# Patient Record
Sex: FEMALE | Race: ASIAN | NOT HISPANIC OR LATINO | Employment: STUDENT | URBAN - METROPOLITAN AREA
[De-identification: names, ages, dates, MRNs, and addresses within clinical notes are randomized per-mention and may not be internally consistent; named-entity substitution may affect disease eponyms.]

---
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
## 2021-01-29 ENCOUNTER — CLINICAL SUPPORT (OUTPATIENT)
Dept: INTERNAL MEDICINE CLINIC | Age: 21
End: 2021-01-29
Payer: COMMERCIAL

## 2021-01-29 DIAGNOSIS — Z23 NEED FOR HEPATITIS B VACCINATION: Primary | ICD-10-CM

## 2021-01-29 PROCEDURE — 90471 IMMUNIZATION ADMIN: CPT

## 2021-01-29 PROCEDURE — 90746 HEPB VACCINE 3 DOSE ADULT IM: CPT

## 2021-03-02 ENCOUNTER — OFFICE VISIT (OUTPATIENT)
Dept: INTERNAL MEDICINE CLINIC | Facility: CLINIC | Age: 21
End: 2021-03-02
Payer: COMMERCIAL

## 2021-03-02 VITALS
TEMPERATURE: 98.5 F | DIASTOLIC BLOOD PRESSURE: 80 MMHG | OXYGEN SATURATION: 97 % | HEIGHT: 60 IN | HEART RATE: 90 BPM | SYSTOLIC BLOOD PRESSURE: 118 MMHG | BODY MASS INDEX: 29.45 KG/M2 | WEIGHT: 150 LBS

## 2021-03-02 DIAGNOSIS — Z78.9 HEPATITIS B VACCINATION STATUS UNKNOWN: ICD-10-CM

## 2021-03-02 DIAGNOSIS — D50.9 IRON DEFICIENCY ANEMIA, UNSPECIFIED IRON DEFICIENCY ANEMIA TYPE: ICD-10-CM

## 2021-03-02 DIAGNOSIS — R73.03 PREDIABETES: ICD-10-CM

## 2021-03-02 DIAGNOSIS — R00.2 PALPITATIONS: Primary | ICD-10-CM

## 2021-03-02 DIAGNOSIS — E78.00 PURE HYPERCHOLESTEROLEMIA: ICD-10-CM

## 2021-03-02 PROBLEM — J02.9 ACUTE PHARYNGITIS: Status: RESOLVED | Noted: 2019-12-31 | Resolved: 2021-03-02

## 2021-03-02 PROCEDURE — 99214 OFFICE O/P EST MOD 30 MIN: CPT | Performed by: NURSE PRACTITIONER

## 2021-03-02 PROCEDURE — 93000 ELECTROCARDIOGRAM COMPLETE: CPT | Performed by: NURSE PRACTITIONER

## 2021-03-02 NOTE — PROGRESS NOTES
Assessment/Plan:       Problem List Items Addressed This Visit        Other    Prediabetes     Check A1c         Relevant Orders    HEMOGLOBIN A1C W/ EAG ESTIMATION    Palpitations - Primary     EKG WNL  Will check CBC, CMP, TSH, iron panel  Follow up in 1 week or sooner  Reviewed red flag signs to call the office with or go to the Emergency Department with  Patient verbalizes understanding  Relevant Orders    CBC and differential    Comprehensive metabolic panel    TSH, 3rd generation with Free T4 reflex    Iron Panel (Includes Ferritin, Iron Sat%, Iron, and TIBC)    HLD (hyperlipidemia)     Check lipid panel         Anemia     Get updated labs to see if this is potentially cause of palpitations  Resume iron supplementation         Relevant Orders    Iron Panel (Includes Ferritin, Iron Sat%, Iron, and TIBC)      Other Visit Diagnoses     Hepatitis B vaccination status unknown        Relevant Orders    Hepatitis B surface antibody            BMI Counseling: Body mass index is 29 29 kg/m²  The BMI is above normal  Nutrition recommendations include encouraging healthy choices of fruits and vegetables  Subjective:      Patient ID: Kal Johns is a 22 y o  female  Pt presents for follow up  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: She reports that 4 days ago she was sitting at her desk doing [facility]- felt like her heart was racing and felt like she was trying to catch her breath  She checked her HR on her watch and it was low 90s  It lasted a couple of minutes  It happened again yesterday at work  She reports that over the course of an hour, she had intermittent episodes and her HR ranged from   She didn't have CP at that time  She felt like she was exercising but she wasn't  She didn't feel stressed/anxious at that time  She denies swelling of her ankles/legs, N/V/D, headaches  She needs to know her hepatitis b titers for her MA program      Palpitations  This is a new problem   The current episode started in the past 7 days  The problem occurs every several days  The problem has been waxing and waning  Pertinent negatives include no abdominal pain, anorexia, arthralgias, change in bowel habit, chest pain, chills, congestion, coughing, diaphoresis, fatigue, fever, headaches, joint swelling, myalgias, nausea, neck pain, numbness, rash, sore throat, swollen glands, urinary symptoms, vertigo, visual change, vomiting or weakness  Nothing aggravates the symptoms  She has tried rest for the symptoms  The treatment provided mild relief  The following portions of the patient's history were reviewed and updated as appropriate: allergies, current medications, past family history, past medical history, past social history, past surgical history and problem list     Review of Systems   Constitutional: Negative for chills, diaphoresis, fatigue and fever  HENT: Negative for congestion and sore throat  Respiratory: Positive for shortness of breath (per HPI)  Negative for cough and wheezing  Cardiovascular: Positive for palpitations  Negative for chest pain and leg swelling  Gastrointestinal: Negative for abdominal pain, anorexia, change in bowel habit, nausea and vomiting  Musculoskeletal: Negative for arthralgias, joint swelling, myalgias and neck pain  Skin: Negative for rash  Neurological: Negative for dizziness, vertigo, weakness, numbness and headaches  Psychiatric/Behavioral: The patient is not nervous/anxious            Past Medical History:   Diagnosis Date    Acute conjunctivitis     Acute lower UTI     Anemia 4/22/2019    Anxiety     Dermatitis, eczematoid     Developmental reading disorder     resolved 08/15/2016    Dysmenorrhea     Eczema     Hand, foot and mouth disease     resolved 08/15/2016    Low back pain     Palpitations     Pharyngitis     Refractive amblyopia     Refractive amblyopia     resolved 08/15/2016    Sebaceous cyst     UTI symptoms     Viral infection     Vitamin D deficiency     Wears glasses     Wears glasses          Current Outpatient Medications:     norgestimate-ethinyl estradiol (Tri-Linyah) 0 18/0 215/0 25 MG-35 MCG per tablet, Take 1 tablet by mouth daily, Disp: 90 tablet, Rfl: 1    No Known Allergies    Social History   Past Surgical History:   Procedure Laterality Date    BREAST BIOPSY Right 04/11/2018    BREAST BIOPSY Right 6/25/2019    Procedure: EXCISIONAL BIOPSY BREAST;  Surgeon: Britton Jackson MD;  Location: [facility];  Service: Surgical Oncology    TYMPANOSTOMY TUBE PLACEMENT  2005    per allscripts     Family History   Adopted: Yes   Problem Relation Age of Onset    No Known Problems Mother     No Known Problems Father        Objective:  /80 (BP Location: Left arm, Patient Position: Sitting, Cuff Size: Adult)   Pulse 90   Temp 98 5 °F (36 9 °C)   Ht 5' (1 524 m)   Wt 68 kg (150 lb)   SpO2 97%   BMI 29 29 kg/m²        Physical Exam  Constitutional:       General: She is not in acute distress  Appearance: She is well-developed  HENT:      Head: Normocephalic and atraumatic  Right Ear: External ear normal       Left Ear: External ear normal       Mouth/Throat:      Pharynx: No oropharyngeal exudate  Eyes:      General: No scleral icterus  Pupils: Pupils are equal, round, and reactive to light  Neck:      Musculoskeletal: Normal range of motion and neck supple  Cardiovascular:      Rate and Rhythm: Normal rate and regular rhythm  Pulmonary:      Effort: Pulmonary effort is normal       Breath sounds: Normal breath sounds  Abdominal:      Palpations: Abdomen is soft  Musculoskeletal: Normal range of motion  Lymphadenopathy:      Cervical: No cervical adenopathy  Skin:     General: Skin is warm  Neurological:      Mental Status: She is alert and oriented to person, place, and time     Psychiatric:         Behavior: Behavior normal

## 2021-03-02 NOTE — ASSESSMENT & PLAN NOTE
EKG WNL  Will check CBC, CMP, TSH, iron panel  Follow up in 1 week or sooner  Reviewed red flag signs to call the office with or go to the Emergency Department with  Patient verbalizes understanding

## 2021-03-04 ENCOUNTER — LAB (OUTPATIENT)
Dept: LAB | Facility: CLINIC | Age: 21
End: 2021-03-04
Payer: COMMERCIAL

## 2021-03-04 DIAGNOSIS — R73.03 PREDIABETES: ICD-10-CM

## 2021-03-04 DIAGNOSIS — Z78.9 HEPATITIS B VACCINATION STATUS UNKNOWN: ICD-10-CM

## 2021-03-04 DIAGNOSIS — D50.9 IRON DEFICIENCY ANEMIA, UNSPECIFIED IRON DEFICIENCY ANEMIA TYPE: ICD-10-CM

## 2021-03-04 DIAGNOSIS — R00.2 PALPITATIONS: ICD-10-CM

## 2021-03-04 LAB
ALBUMIN SERPL BCP-MCNC: 3.8 G/DL (ref 3.5–5)
ALP SERPL-CCNC: 84 U/L (ref 46–116)
ALT SERPL W P-5'-P-CCNC: 13 U/L (ref 12–78)
ANION GAP SERPL CALCULATED.3IONS-SCNC: 4 MMOL/L (ref 4–13)
AST SERPL W P-5'-P-CCNC: 10 U/L (ref 5–45)
BASOPHILS # BLD AUTO: 0.03 THOUSANDS/ΜL (ref 0–0.1)
BASOPHILS NFR BLD AUTO: 1 % (ref 0–1)
BILIRUB SERPL-MCNC: 0.47 MG/DL (ref 0.2–1)
BUN SERPL-MCNC: 16 MG/DL (ref 5–25)
CALCIUM SERPL-MCNC: 8.7 MG/DL (ref 8.3–10.1)
CHLORIDE SERPL-SCNC: 104 MMOL/L (ref 100–108)
CO2 SERPL-SCNC: 29 MMOL/L (ref 21–32)
CREAT SERPL-MCNC: 0.54 MG/DL (ref 0.6–1.3)
EOSINOPHIL # BLD AUTO: 0.08 THOUSAND/ΜL (ref 0–0.61)
EOSINOPHIL NFR BLD AUTO: 2 % (ref 0–6)
ERYTHROCYTE [DISTWIDTH] IN BLOOD BY AUTOMATED COUNT: 11.6 % (ref 11.6–15.1)
EST. AVERAGE GLUCOSE BLD GHB EST-MCNC: 117 MG/DL
FERRITIN SERPL-MCNC: 29 NG/ML (ref 8–388)
GFR SERPL CREATININE-BSD FRML MDRD: 136 ML/MIN/1.73SQ M
GLUCOSE P FAST SERPL-MCNC: 102 MG/DL (ref 65–99)
HBA1C MFR BLD: 5.7 %
HBV SURFACE AB SER-ACNC: >1000 MIU/ML
HCT VFR BLD AUTO: 39 % (ref 34.8–46.1)
HGB BLD-MCNC: 12.4 G/DL (ref 11.5–15.4)
IMM GRANULOCYTES # BLD AUTO: 0.01 THOUSAND/UL (ref 0–0.2)
IMM GRANULOCYTES NFR BLD AUTO: 0 % (ref 0–2)
IRON SATN MFR SERPL: 43 %
IRON SERPL-MCNC: 185 UG/DL (ref 50–170)
LYMPHOCYTES # BLD AUTO: 2.18 THOUSANDS/ΜL (ref 0.6–4.47)
LYMPHOCYTES NFR BLD AUTO: 45 % (ref 14–44)
MCH RBC QN AUTO: 27.4 PG (ref 26.8–34.3)
MCHC RBC AUTO-ENTMCNC: 31.8 G/DL (ref 31.4–37.4)
MCV RBC AUTO: 86 FL (ref 82–98)
MONOCYTES # BLD AUTO: 0.38 THOUSAND/ΜL (ref 0.17–1.22)
MONOCYTES NFR BLD AUTO: 8 % (ref 4–12)
NEUTROPHILS # BLD AUTO: 2.17 THOUSANDS/ΜL (ref 1.85–7.62)
NEUTS SEG NFR BLD AUTO: 44 % (ref 43–75)
NRBC BLD AUTO-RTO: 0 /100 WBCS
PLATELET # BLD AUTO: 286 THOUSANDS/UL (ref 149–390)
PMV BLD AUTO: 10.1 FL (ref 8.9–12.7)
POTASSIUM SERPL-SCNC: 3.9 MMOL/L (ref 3.5–5.3)
PROT SERPL-MCNC: 7.4 G/DL (ref 6.4–8.2)
RBC # BLD AUTO: 4.53 MILLION/UL (ref 3.81–5.12)
SODIUM SERPL-SCNC: 137 MMOL/L (ref 136–145)
TIBC SERPL-MCNC: 429 UG/DL (ref 250–450)
TSH SERPL DL<=0.05 MIU/L-ACNC: 1.24 UIU/ML (ref 0.46–3.98)
WBC # BLD AUTO: 4.85 THOUSAND/UL (ref 4.31–10.16)

## 2021-03-04 PROCEDURE — 82728 ASSAY OF FERRITIN: CPT

## 2021-03-04 PROCEDURE — 86706 HEP B SURFACE ANTIBODY: CPT

## 2021-03-04 PROCEDURE — 83540 ASSAY OF IRON: CPT

## 2021-03-04 PROCEDURE — 80053 COMPREHEN METABOLIC PANEL: CPT

## 2021-03-04 PROCEDURE — 83550 IRON BINDING TEST: CPT

## 2021-03-04 PROCEDURE — 36415 COLL VENOUS BLD VENIPUNCTURE: CPT

## 2021-03-04 PROCEDURE — 84443 ASSAY THYROID STIM HORMONE: CPT

## 2021-03-04 PROCEDURE — 83036 HEMOGLOBIN GLYCOSYLATED A1C: CPT

## 2021-03-04 PROCEDURE — 85025 COMPLETE CBC W/AUTO DIFF WBC: CPT

## 2021-03-09 ENCOUNTER — OFFICE VISIT (OUTPATIENT)
Dept: INTERNAL MEDICINE CLINIC | Facility: CLINIC | Age: 21
End: 2021-03-09
Payer: COMMERCIAL

## 2021-03-09 VITALS
SYSTOLIC BLOOD PRESSURE: 122 MMHG | TEMPERATURE: 98.2 F | HEIGHT: 60 IN | WEIGHT: 151 LBS | OXYGEN SATURATION: 98 % | DIASTOLIC BLOOD PRESSURE: 70 MMHG | HEART RATE: 92 BPM | BODY MASS INDEX: 29.64 KG/M2

## 2021-03-09 DIAGNOSIS — D50.9 IRON DEFICIENCY ANEMIA, UNSPECIFIED IRON DEFICIENCY ANEMIA TYPE: ICD-10-CM

## 2021-03-09 DIAGNOSIS — F41.9 ANXIETY: Primary | ICD-10-CM

## 2021-03-09 DIAGNOSIS — R73.03 PREDIABETES: ICD-10-CM

## 2021-03-09 DIAGNOSIS — E78.00 PURE HYPERCHOLESTEROLEMIA: ICD-10-CM

## 2021-03-09 DIAGNOSIS — E55.9 VITAMIN D DEFICIENCY: ICD-10-CM

## 2021-03-09 DIAGNOSIS — R00.2 PALPITATIONS: ICD-10-CM

## 2021-03-09 PROCEDURE — 99214 OFFICE O/P EST MOD 30 MIN: CPT | Performed by: NURSE PRACTITIONER

## 2021-03-09 RX ORDER — SERTRALINE HYDROCHLORIDE 25 MG/1
25 TABLET, FILM COATED ORAL DAILY
Qty: 30 TABLET | Refills: 2 | Status: SHIPPED | OUTPATIENT
Start: 2021-03-09 | End: 2021-05-28 | Stop reason: SDUPTHER

## 2021-03-09 NOTE — PROGRESS NOTES
Assessment/Plan:       Problem List Items Addressed This Visit        Other    Vitamin D deficiency     Lab wasn't completed with recent labs  Will have patient get it completed         Prediabetes     A1c 5 7, continue to make lifestyle modifications         Palpitations     No further episodes  EKG last week  F/u if symptoms recur         HLD (hyperlipidemia)     Lipid panel wasn't collected when labs completed last week  Will have patient get them tested         Anxiety - Primary     Start sertraline 25mg daily  No previous SSRI use  Continue CBT  Expected side effects and onset of benefit reviewed  Reviewed red flag signs to call the office with or go to the Emergency Department with  Patient verbalizes understanding  F/u in 1 month or sooner          Relevant Medications    sertraline (ZOLOFT) 25 mg tablet    Anemia     Stable at present                      Subjective:      Patient ID: Kristin Montez is a 22 y o  female  Patient presents for a 1 week follow up on chronic conditions and palpitations  She had labs completed on 3/4, results reviewed  A1c unchanged at 5 7  Iron studies stable  CBC, CMP, TSH all WNL  She has not had any heart palpitations since last visit  She denies new symptoms    She has been taking to her counselor about whether she would like to start something for anxiety  She has been seeing her counselor for the past couple of months  She has panic attacks about 1-2 times per month if she is in a high-stress situation  Anxiety  Presents for initial visit  Onset was more than 5 years ago  The problem has been gradually worsening  Symptoms include hyperventilation, irritability, nervous/anxious behavior and panic   Patient reports no chest pain, compulsions, confusion, decreased concentration, depressed mood, dizziness, dry mouth, excessive worry, feeling of choking, insomnia, muscle tension, nausea, obsessions, palpitations, restlessness, shortness of breath or suicidal ideas  Symptoms occur occasionally  The severity of symptoms is mild  Exacerbated by: school  The quality of sleep is good  There are no known risk factors  Her past medical history is significant for anemia  Past treatments include counseling (CBT)  The treatment provided mild relief  Compliance with prior treatments has been good  The following portions of the patient's history were reviewed and updated as appropriate: allergies, current medications, past family history, past medical history, past social history, past surgical history and problem list     Review of Systems   Constitutional: Positive for irritability  Negative for chills, fatigue and fever  HENT: Negative for trouble swallowing  Respiratory: Negative for shortness of breath and wheezing  Cardiovascular: Negative for chest pain, palpitations and leg swelling  Gastrointestinal: Negative for abdominal pain, constipation, diarrhea, nausea and vomiting  Genitourinary: Negative for difficulty urinating  Musculoskeletal: Negative for arthralgias and gait problem  Skin: Negative for rash  Neurological: Negative for dizziness, light-headedness and headaches  Psychiatric/Behavioral: Negative for agitation, confusion, decreased concentration, self-injury, sleep disturbance and suicidal ideas  The patient is nervous/anxious  The patient does not have insomnia            Past Medical History:   Diagnosis Date    Acute conjunctivitis     Acute lower UTI     Anemia 4/22/2019    Anxiety     Dermatitis, eczematoid     Developmental reading disorder     resolved 08/15/2016    Dysmenorrhea     Eczema     Hand, foot and mouth disease     resolved 08/15/2016    Low back pain     Palpitations     Pharyngitis     Refractive amblyopia     Refractive amblyopia     resolved 08/15/2016    Sebaceous cyst     UTI symptoms     Viral infection     Vitamin D deficiency     Wears glasses     Wears glasses          Current Outpatient Medications:     norgestimate-ethinyl estradiol (Tri-Linyah) 0 18/0 215/0 25 MG-35 MCG per tablet, Take 1 tablet by mouth daily, Disp: 90 tablet, Rfl: 1    sertraline (ZOLOFT) 25 mg tablet, Take 1 tablet (25 mg total) by mouth daily, Disp: 30 tablet, Rfl: 2    No Known Allergies    Social History   Past Surgical History:   Procedure Laterality Date    BREAST BIOPSY Right 04/11/2018    BREAST BIOPSY Right 6/25/2019    Procedure: EXCISIONAL BIOPSY BREAST;  Surgeon: Deedee Ann MD;  Location: [facility] OR;  Service: Surgical Oncology    TYMPANOSTOMY TUBE PLACEMENT  2005    per allscripts     Family History   Adopted: Yes   Problem Relation Age of Onset    No Known Problems Mother     No Known Problems Father        Objective:  /70 (BP Location: Left arm, Patient Position: Sitting, Cuff Size: Adult)   Pulse 92   Temp 98 2 °F (36 8 °C)   Ht 5' (1 524 m)   Wt 68 5 kg (151 lb)   SpO2 98%   BMI 29 49 kg/m²        Physical Exam  Constitutional:       General: She is not in acute distress  Appearance: She is well-developed  HENT:      Head: Normocephalic and atraumatic  Right Ear: External ear normal       Left Ear: External ear normal       Mouth/Throat:      Pharynx: No oropharyngeal exudate  Eyes:      General: No scleral icterus  Pupils: Pupils are equal, round, and reactive to light  Neck:      Musculoskeletal: Normal range of motion and neck supple  Cardiovascular:      Rate and Rhythm: Normal rate and regular rhythm  Pulmonary:      Effort: Pulmonary effort is normal       Breath sounds: Normal breath sounds  Abdominal:      Palpations: Abdomen is soft  Musculoskeletal: Normal range of motion  Lymphadenopathy:      Cervical: No cervical adenopathy  Skin:     General: Skin is warm  Neurological:      Mental Status: She is alert and oriented to person, place, and time     Psychiatric:         Behavior: Behavior normal

## 2021-03-09 NOTE — PATIENT INSTRUCTIONS
Prescribed a new medication today for depression/anxiety  Start with 1/2 tab daily for 1 week then increase to full tab daily  This medication can take up to 3-12 weeks to take full effect  Any adverse reactions, such as worsening symptoms, decreased libido, or suicidal ideation, warrant a call to the office urgently and a change in medication  These negative effects likely show up early in the medication course  Follow up in 1 month or sooner as needed

## 2021-03-09 NOTE — ASSESSMENT & PLAN NOTE
Start sertraline 25mg daily  No previous SSRI use  Continue CBT  Expected side effects and onset of benefit reviewed  Reviewed red flag signs to call the office with or go to the Emergency Department with  Patient verbalizes understanding     F/u in 1 month or sooner

## 2021-03-12 ENCOUNTER — CLINICAL SUPPORT (OUTPATIENT)
Dept: INTERNAL MEDICINE CLINIC | Facility: CLINIC | Age: 21
End: 2021-03-12
Payer: COMMERCIAL

## 2021-03-12 DIAGNOSIS — Z23 NEED FOR HPV VACCINATION: Primary | ICD-10-CM

## 2021-03-12 PROCEDURE — 90471 IMMUNIZATION ADMIN: CPT

## 2021-03-12 PROCEDURE — 90651 9VHPV VACCINE 2/3 DOSE IM: CPT

## 2021-04-12 ENCOUNTER — OFFICE VISIT (OUTPATIENT)
Dept: INTERNAL MEDICINE CLINIC | Facility: CLINIC | Age: 21
End: 2021-04-12
Payer: COMMERCIAL

## 2021-04-12 VITALS
SYSTOLIC BLOOD PRESSURE: 110 MMHG | BODY MASS INDEX: 27.45 KG/M2 | TEMPERATURE: 98.6 F | DIASTOLIC BLOOD PRESSURE: 68 MMHG | HEIGHT: 61 IN | WEIGHT: 145.4 LBS | OXYGEN SATURATION: 97 % | HEART RATE: 96 BPM

## 2021-04-12 DIAGNOSIS — F41.9 ANXIETY: ICD-10-CM

## 2021-04-12 DIAGNOSIS — N94.6 DYSMENORRHEA: Primary | ICD-10-CM

## 2021-04-12 PROCEDURE — 99213 OFFICE O/P EST LOW 20 MIN: CPT | Performed by: NURSE PRACTITIONER

## 2021-04-12 RX ORDER — NORGESTIMATE AND ETHINYL ESTRADIOL 7DAYSX3 28
1 KIT ORAL DAILY
Qty: 90 TABLET | Refills: 2 | Status: SHIPPED | OUTPATIENT
Start: 2021-04-12 | End: 2021-10-15 | Stop reason: SDUPTHER

## 2021-04-12 NOTE — ASSESSMENT & PLAN NOTE
Patient is doing well on current dosing of Zoloft    Continue dosing in follow-up in 3 months or sooner as needed

## 2021-04-12 NOTE — ASSESSMENT & PLAN NOTE
Will refill OCP  Counseled on using barrier methods to prevent STIs  Educated on risks of VTE, avoiding smoking, importance of compliance     Will start paps when patient turns 22

## 2021-04-12 NOTE — PROGRESS NOTES
Assessment/Plan:       Problem List Items Addressed This Visit        Genitourinary    Dysmenorrhea - Primary     Will refill OCP  Counseled on using barrier methods to prevent STIs  Educated on risks of VTE, avoiding smoking, importance of compliance  Will start paps when patient turns 22         Relevant Medications    norgestimate-ethinyl estradiol (Tri-Linyah) 0 18/0 215/0 25 MG-35 MCG per tablet       Other    Anxiety     Patient is doing well on current dosing of Zoloft  Continue dosing in follow-up in 3 months or sooner as needed                      Subjective:      Patient ID: Isael Lugo is a 22 y o  female  Pt presents for follow up on anxiety  She is also requesting a refill of her OCP  Anxiety:  At her last visit, she was started on sertraline 25 mg daily  She reports that her anxiety is improving and she is feeling that the medication is providing her some benefit  She denies further panic attacks, feels comfortable with her current dosing at present  Contraception Counseling  Patient presents for contraception counseling  The patient has no complaints today  The patient is sexually active  Pertinent past medical history: none  She denies being a smoker, having hypertension, VTE, STD history, UTI history, migraines  She is currently taking Tri-Linyah and is happy with that OCP  Denies intermenstrual bleeding  Is compliant with taking it daily  The following portions of the patient's history were reviewed and updated as appropriate: allergies, current medications, past family history, past medical history, past social history, past surgical history and problem list     Review of Systems   Constitutional: Negative for chills and fever  HENT: Negative for trouble swallowing  Respiratory: Negative for shortness of breath  Cardiovascular: Negative for chest pain  Gastrointestinal: Negative for abdominal pain, diarrhea, nausea and vomiting     Genitourinary: Negative for difficulty urinating  Musculoskeletal: Negative for gait problem  Skin: Negative for rash  Neurological: Negative for dizziness and headaches  Psychiatric/Behavioral: Negative for dysphoric mood and sleep disturbance  The patient is nervous/anxious (improving)  Past Medical History:   Diagnosis Date    Acute conjunctivitis     Acute lower UTI     Anemia 4/22/2019    Anxiety     Dermatitis, eczematoid     Developmental reading disorder     resolved 08/15/2016    Dysmenorrhea     Eczema     Hand, foot and mouth disease     resolved 08/15/2016    Low back pain     Palpitations     Pharyngitis     Refractive amblyopia     Refractive amblyopia     resolved 08/15/2016    Sebaceous cyst     UTI symptoms     Viral infection     Vitamin D deficiency     Wears glasses     Wears glasses          Current Outpatient Medications:     norgestimate-ethinyl estradiol (Tri-Linyah) 0 18/0 215/0 25 MG-35 MCG per tablet, Take 1 tablet by mouth daily, Disp: 90 tablet, Rfl: 2    sertraline (ZOLOFT) 25 mg tablet, Take 1 tablet (25 mg total) by mouth daily, Disp: 30 tablet, Rfl: 2    No Known Allergies    Social History   Past Surgical History:   Procedure Laterality Date    BREAST BIOPSY Right 04/11/2018    BREAST BIOPSY Right 6/25/2019    Procedure: EXCISIONAL BIOPSY BREAST;  Surgeon: Olinda Morales MD;  Location: [facility] OR;  Service: Surgical Oncology    TYMPANOSTOMY TUBE PLACEMENT  2005    per allscripts     Family History   Adopted: Yes   Problem Relation Age of Onset    No Known Problems Mother     No Known Problems Father        Objective:  /68 (BP Location: Left arm, Patient Position: Sitting, Cuff Size: Adult)   Pulse 96   Temp 98 6 °F (37 °C) (Tympanic)   Ht 5' 0 5" (1 537 m)   Wt 66 kg (145 lb 6 4 oz)   SpO2 97% Comment: ra  BMI 27 93 kg/m²        Physical Exam  Constitutional:       General: She is not in acute distress  Appearance: She is well-developed     HENT: Head: Normocephalic and atraumatic  Right Ear: External ear normal       Left Ear: External ear normal       Mouth/Throat:      Pharynx: No oropharyngeal exudate  Eyes:      General: No scleral icterus  Pupils: Pupils are equal, round, and reactive to light  Neck:      Musculoskeletal: Normal range of motion and neck supple  Thyroid: No thyromegaly  Cardiovascular:      Rate and Rhythm: Normal rate and regular rhythm  Pulmonary:      Effort: Pulmonary effort is normal  No respiratory distress  Breath sounds: Normal breath sounds  Abdominal:      General: Bowel sounds are normal  There is no distension  Palpations: Abdomen is soft  Musculoskeletal: Normal range of motion  Lymphadenopathy:      Cervical: No cervical adenopathy  Skin:     General: Skin is warm and dry  Neurological:      Mental Status: She is alert and oriented to person, place, and time     Psychiatric:         Behavior: Behavior normal

## 2021-04-28 ENCOUNTER — TELEMEDICINE (OUTPATIENT)
Dept: INTERNAL MEDICINE CLINIC | Age: 21
End: 2021-04-28
Payer: COMMERCIAL

## 2021-04-28 VITALS — WEIGHT: 144 LBS | HEIGHT: 61 IN | BODY MASS INDEX: 27.19 KG/M2

## 2021-04-28 DIAGNOSIS — J06.9 URTI (ACUTE UPPER RESPIRATORY INFECTION): Primary | ICD-10-CM

## 2021-04-28 DIAGNOSIS — J02.8 PHARYNGITIS DUE TO OTHER ORGANISM: ICD-10-CM

## 2021-04-28 PROCEDURE — 99213 OFFICE O/P EST LOW 20 MIN: CPT | Performed by: INTERNAL MEDICINE

## 2021-04-28 RX ORDER — CEPHALEXIN 500 MG/1
500 CAPSULE ORAL EVERY 12 HOURS SCHEDULED
Qty: 20 CAPSULE | Refills: 0 | Status: SHIPPED | OUTPATIENT
Start: 2021-04-28 | End: 2021-05-08

## 2021-04-28 RX ORDER — FLUTICASONE PROPIONATE 50 MCG
1 SPRAY, SUSPENSION (ML) NASAL DAILY
Qty: 1 BOTTLE | Refills: 0 | Status: SHIPPED | OUTPATIENT
Start: 2021-04-28 | End: 2021-07-14

## 2021-04-28 NOTE — PROGRESS NOTES
Virtual Regular Visit      Assessment/Plan:      Upper respiratory tract infection with pharyngitis  - likely viral with bacterial superinfection vs bacterial  - we will start pt on  Cephalexin 500 mg twice daily for 10 days, Flonase nasal spray for rhinitis and she was counseled to buy over-the-counter Mucinex for productive cough  - Pt may use OTC tylenol or ibuprofen with meals for aches and pains, warm salt water gargles for any sore throat and was encouraged to drink lots of fluids, get plenty of rest and wash her hands liberally  - pt was also counseled to take antibiotics with food and also to use a probiotic like yogurt daily as long as she is taking antibiotics  - She should return to the clinic if her symptoms worsen or do not improve  Problem List Items Addressed This Visit        Digestive    Pharyngitis    Relevant Medications    fluticasone (FLONASE) 50 mcg/act nasal spray    cephalexin (KEFLEX) 500 mg capsule       Respiratory    URTI (acute upper respiratory infection) - Primary    Relevant Medications    fluticasone (FLONASE) 50 mcg/act nasal spray    cephalexin (KEFLEX) 500 mg capsule               Reason for visit is   Chief Complaint   Patient presents with    Sinus Problem     sinus congetion x10 days, has tried OTC medication and not helping     Virtual Regular Visit        Encounter provider Maya Pacheco DO    Provider located at [address] [facility] [id number]      Recent Visits  No visits were found meeting these conditions  Showing recent visits within past 7 days and meeting all other requirements     Today's Visits  Date Type Provider Dept   04/28/21 Telemedicine Amirah Eubanks DO [facility]   Showing today's visits and meeting all other requirements     Future Appointments  No visits were found meeting these conditions     Showing future appointments within next 150 days and meeting all other requirements        The patient was identified by name and date of birth  Alli Johnston was informed that this is a telemedicine visit and that the visit is being conducted through [address] and patient was informed that this is not a secure, HIPAA-compliant platform  She agrees to proceed     My office door was closed  No one else was in the room  She acknowledged consent and understanding of privacy and security of the video platform  The patient has agreed to participate and understands they can discontinue the visit at any time  Patient is aware this is a billable service  Subjective  Alli Johnston is a 22 y o  female    HPI     Patient presents with complaints of  Nasal/sinus congestion, rhinorrhea, postnasal drip, a feeling of pressure in her ears especially when she blows her nose, and cough productive of yellowish green phlegm for the past 10 days  She states that she has tried Mucinex and over-the-counter antihistamine but her symptoms have persisted  She denies fever, chills, night sweats, sore throat, sneezing,  Headache,shortness of breath, palpitations, chest pain, nausea, vomiting, abdominal pain, diarrhea, constipation, myalgias, arthralgias, change in sense of taste or smell      Past Medical History:   Diagnosis Date    Acute conjunctivitis     Acute lower UTI     Anemia 4/22/2019    Anxiety     Dermatitis, eczematoid     Developmental reading disorder     resolved 08/15/2016    Dysmenorrhea     Eczema     Hand, foot and mouth disease     resolved 08/15/2016    Low back pain     Palpitations     Pharyngitis     Refractive amblyopia     Refractive amblyopia     resolved 08/15/2016    Sebaceous cyst     UTI symptoms     Viral infection     Vitamin D deficiency     Wears glasses     Wears glasses        Past Surgical History:   Procedure Laterality Date    BREAST BIOPSY Right 04/11/2018    BREAST BIOPSY Right 6/25/2019    Procedure: EXCISIONAL BIOPSY BREAST;  Surgeon: Luz Maria Ferris MD;  [facility];  Service: Surgical Oncology    TYMPANOSTOMY TUBE PLACEMENT  2005    per allscripts       Current Outpatient Medications   Medication Sig Dispense Refill    norgestimate-ethinyl estradiol (Tri-Linyah) 0 18/0 215/0 25 MG-35 MCG per tablet Take 1 tablet by mouth daily 90 tablet 2    sertraline (ZOLOFT) 25 mg tablet Take 1 tablet (25 mg total) by mouth daily 30 tablet 2    cephalexin (KEFLEX) 500 mg capsule Take 1 capsule (500 mg total) by mouth every 12 (twelve) hours for 10 days 20 capsule 0    fluticasone (FLONASE) 50 mcg/act nasal spray 1 spray into each nostril daily 1 Bottle 0     No current facility-administered medications for this visit  No Known Allergies    Review of Systems   Constitutional: Negative for activity change, chills, fatigue, fever and unexpected weight change  HENT: Positive for congestion, postnasal drip (now resolved) and rhinorrhea (clear colored discharge for 10 days and has taken mucinex and zyrtec with out improving)  Negative for ear pain (pressure when she blows her nose), sinus pressure, sneezing and sore throat  Eyes: Negative for pain  Respiratory: Positive for cough (occasionally productive of yellowish green phlegm)  Negative for choking, chest tightness, shortness of breath and wheezing  Cardiovascular: Negative for chest pain, palpitations and leg swelling  Gastrointestinal: Negative for abdominal pain, constipation, diarrhea, nausea and vomiting  Genitourinary: Negative for dysuria and hematuria  Musculoskeletal: Negative for arthralgias, back pain, gait problem, joint swelling, myalgias and neck stiffness  Skin: Negative for pallor and rash  Neurological: Negative for dizziness, tremors, seizures, syncope, light-headedness and headaches  Hematological: Negative for adenopathy  Psychiatric/Behavioral: Negative for behavioral problems         Video Exam    Vitals: 04/28/21 1557   Weight: 65 3 kg (144 lb)   Height: 5' 0 5" (1 537 m)       Physical Exam  Constitutional:       General: She is not in acute distress  Appearance: Normal appearance  She is not ill-appearing or toxic-appearing  HENT:      Head: Normocephalic and atraumatic  Mouth/Throat:      Mouth: Mucous membranes are dry  Pharynx: Posterior oropharyngeal erythema ( oropharyngeal erythema with dry mucous membranes) present  Eyes:      General: No scleral icterus  Right eye: No discharge  Left eye: No discharge  Neck:      Musculoskeletal: Normal range of motion  Pulmonary:      Effort: Pulmonary effort is normal  No respiratory distress (  No conversational dyspnea)  Abdominal:      Tenderness: There is no abdominal tenderness ( no tenderness on self palpation)  Skin:     Coloration: Skin is not jaundiced  Neurological:      Mental Status: She is alert and oriented to person, place, and time  Psychiatric:         Behavior: Behavior normal           I spent 15 minutes directly with the patient during this visit      VIRTUAL VISIT DISCLAIMER    Mita Hayward acknowledges that she has consented to an online visit or consultation  She understands that the online visit is based solely on information provided by her, and that, in the absence of a face-to-face physical evaluation by the physician, the diagnosis she receives is both limited and provisional in terms of accuracy and completeness  This is not intended to replace a full medical face-to-face evaluation by the physician  Mita Hayward understands and accepts these terms

## 2021-04-28 NOTE — PATIENT INSTRUCTIONS
- Drink plenty of fluids  - Get plenty of rest  - You may use salt water gargles for your sore throat  - You may use over the counter tylenol or Ibuprofen (motrin or Advil) for any headache, muscle aches and fever  - Please take your antibiotics with meals and also take a probiotic like yogurt daily as long as you are on your antibiotics  - Call the office if your symptoms persist beyond a total of 7-10 days

## 2021-05-28 DIAGNOSIS — F41.9 ANXIETY: ICD-10-CM

## 2021-05-28 NOTE — TELEPHONE ENCOUNTER
Pending appt 7/12/2021  Last assessed 4/12/2021      [facility] for pt to call me at [facility] office    Pt called and LMOM on rx line stating she needed refill of zoloft but did not state what pharmacy  LM requesting she call office and let us know what pharmacy she wants medication sent

## 2021-06-01 RX ORDER — SERTRALINE HYDROCHLORIDE 25 MG/1
25 TABLET, FILM COATED ORAL DAILY
Qty: 30 TABLET | Refills: 2 | Status: SHIPPED | OUTPATIENT
Start: 2021-06-01 | End: 2021-07-14 | Stop reason: SDUPTHER

## 2021-07-14 ENCOUNTER — OFFICE VISIT (OUTPATIENT)
Dept: INTERNAL MEDICINE CLINIC | Facility: CLINIC | Age: 21
End: 2021-07-14
Payer: COMMERCIAL

## 2021-07-14 VITALS
OXYGEN SATURATION: 98 % | TEMPERATURE: 98.6 F | DIASTOLIC BLOOD PRESSURE: 72 MMHG | BODY MASS INDEX: 24.55 KG/M2 | WEIGHT: 130 LBS | HEART RATE: 100 BPM | SYSTOLIC BLOOD PRESSURE: 112 MMHG | HEIGHT: 61 IN

## 2021-07-14 DIAGNOSIS — F51.01 PRIMARY INSOMNIA: ICD-10-CM

## 2021-07-14 DIAGNOSIS — F41.9 ANXIETY: Primary | ICD-10-CM

## 2021-07-14 PROBLEM — J02.9 PHARYNGITIS: Status: RESOLVED | Noted: 2019-12-31 | Resolved: 2021-07-14

## 2021-07-14 PROCEDURE — 99213 OFFICE O/P EST LOW 20 MIN: CPT | Performed by: NURSE PRACTITIONER

## 2021-07-14 NOTE — PROGRESS NOTES
Assessment/Plan:       Problem List Items Addressed This Visit        Other    Primary insomnia     Discussed sleep hygiene  Start melatonin and monitor effect           Anxiety - Primary     Will increase Zoloft from 25mg to 50mg and monitor effect  F /u in 3 months or sooner as needed         Relevant Medications    sertraline (ZOLOFT) 50 mg tablet                 Subjective:      Patient ID: Dougie Sparks is a 22 y o  female  Patient presents for a 3 month follow up  No recent labs completed  She is currently taking Zoloft for her anxiety  She feels that it is controlling her symptoms  She admits that she gets nervous sometimes but does not get to the point of panic anymore  She does still have some anxiety, and is willing to consider increasing her dosing to help with that  She denies new concerns today  The following portions of the patient's history were reviewed and updated as appropriate: allergies, current medications, past family history, past medical history, past social history, past surgical history and problem list     Review of Systems   Constitutional: Negative for chills, fatigue and fever  HENT: Negative for trouble swallowing  Respiratory: Negative for shortness of breath and wheezing  Cardiovascular: Negative for chest pain and leg swelling  Gastrointestinal: Negative for abdominal pain, diarrhea, nausea and vomiting  Genitourinary: Negative for difficulty urinating  Musculoskeletal: Negative for gait problem  Neurological: Negative for dizziness, light-headedness and headaches  Psychiatric/Behavioral: Positive for sleep disturbance (intermittently)  The patient is nervous/anxious (per HPI)            Past Medical History:   Diagnosis Date    Acute conjunctivitis     Acute lower UTI     Anemia 4/22/2019    Anxiety     Dermatitis, eczematoid     Developmental reading disorder     resolved 08/15/2016    Dysmenorrhea     Eczema     Hand, foot and mouth disease     resolved 08/15/2016    Low back pain     Palpitations     Pharyngitis     Pharyngitis 12/31/2019    Refractive amblyopia     Refractive amblyopia     resolved 08/15/2016    Sebaceous cyst     UTI symptoms     Viral infection     Vitamin D deficiency     Wears glasses     Wears glasses          Current Outpatient Medications:     norgestimate-ethinyl estradiol (Tri-Linyah) 0 18/0 215/0 25 MG-35 MCG per tablet, Take 1 tablet by mouth daily, Disp: 90 tablet, Rfl: 2    sertraline (ZOLOFT) 50 mg tablet, Take 1 tablet (50 mg total) by mouth daily, Disp: 90 tablet, Rfl: 1    No Known Allergies    Social History   Past Surgical History:   Procedure Laterality Date    BREAST BIOPSY Right 04/11/2018    BREAST BIOPSY Right 6/25/2019    Procedure: EXCISIONAL BIOPSY BREAST;  Surgeon: Charisse Medina MD;  Location: [facility];  Service: Surgical Oncology    TYMPANOSTOMY TUBE PLACEMENT  2005    per allscripts     Family History   Adopted: Yes   Problem Relation Age of Onset    No Known Problems Mother     No Known Problems Father        Objective:  /72 (BP Location: Left arm, Patient Position: Sitting, Cuff Size: Adult)   Pulse 100   Temp 98 6 °F (37 °C)   Ht 5' 0 5" (1 537 m)   Wt 59 kg (130 lb)   SpO2 98%   BMI 24 97 kg/m²        Physical Exam  Constitutional:       General: She is not in acute distress  Appearance: She is well-developed  HENT:      Head: Normocephalic and atraumatic  Right Ear: External ear normal       Left Ear: External ear normal       Mouth/Throat:      Pharynx: No oropharyngeal exudate  Eyes:      General: No scleral icterus  Pupils: Pupils are equal, round, and reactive to light  Neck:      Thyroid: No thyromegaly  Cardiovascular:      Rate and Rhythm: Normal rate and regular rhythm  Pulmonary:      Effort: Pulmonary effort is normal  No respiratory distress  Breath sounds: Normal breath sounds     Abdominal:      General: Bowel sounds are normal  There is no distension  Palpations: Abdomen is soft  Musculoskeletal:         General: Normal range of motion  Cervical back: Normal range of motion and neck supple  Lymphadenopathy:      Cervical: No cervical adenopathy  Skin:     General: Skin is warm and dry  Neurological:      Mental Status: She is alert and oriented to person, place, and time     Psychiatric:         Behavior: Behavior normal

## 2021-07-14 NOTE — PATIENT INSTRUCTIONS
Try melatonin 3, 5, or 10mg at night 30 minutes before bed  Increase zoloft from 25mg to 50mg daily

## 2021-10-01 ENCOUNTER — TELEPHONE (OUTPATIENT)
Dept: INTERNAL MEDICINE CLINIC | Facility: CLINIC | Age: 21
End: 2021-10-01

## 2021-10-11 ENCOUNTER — TELEMEDICINE (OUTPATIENT)
Dept: INTERNAL MEDICINE CLINIC | Facility: CLINIC | Age: 21
End: 2021-10-11
Payer: COMMERCIAL

## 2021-10-11 DIAGNOSIS — R53.83 FATIGUE, UNSPECIFIED TYPE: ICD-10-CM

## 2021-10-11 DIAGNOSIS — E55.9 VITAMIN D DEFICIENCY: Primary | ICD-10-CM

## 2021-10-11 DIAGNOSIS — R73.03 PREDIABETES: ICD-10-CM

## 2021-10-11 DIAGNOSIS — D50.9 IRON DEFICIENCY ANEMIA, UNSPECIFIED IRON DEFICIENCY ANEMIA TYPE: ICD-10-CM

## 2021-10-11 DIAGNOSIS — E78.00 PURE HYPERCHOLESTEROLEMIA: ICD-10-CM

## 2021-10-11 PROBLEM — J06.9 URTI (ACUTE UPPER RESPIRATORY INFECTION): Status: RESOLVED | Noted: 2021-04-28 | Resolved: 2021-10-11

## 2021-10-11 PROCEDURE — 99214 OFFICE O/P EST MOD 30 MIN: CPT | Performed by: NURSE PRACTITIONER

## 2021-10-15 DIAGNOSIS — N94.6 DYSMENORRHEA: ICD-10-CM

## 2021-10-15 DIAGNOSIS — F41.9 ANXIETY: ICD-10-CM

## 2021-10-15 RX ORDER — NORGESTIMATE AND ETHINYL ESTRADIOL 7DAYSX3 28
1 KIT ORAL DAILY
Qty: 90 TABLET | Refills: 2 | Status: SHIPPED | OUTPATIENT
Start: 2021-10-15

## 2021-10-30 ENCOUNTER — APPOINTMENT (OUTPATIENT)
Dept: LAB | Age: 21
End: 2021-10-30
Payer: COMMERCIAL

## 2021-10-30 DIAGNOSIS — R73.03 PREDIABETES: ICD-10-CM

## 2021-10-30 DIAGNOSIS — R53.83 FATIGUE, UNSPECIFIED TYPE: ICD-10-CM

## 2021-10-30 DIAGNOSIS — E55.9 VITAMIN D DEFICIENCY: ICD-10-CM

## 2021-10-30 DIAGNOSIS — D50.9 IRON DEFICIENCY ANEMIA, UNSPECIFIED IRON DEFICIENCY ANEMIA TYPE: ICD-10-CM

## 2021-10-30 DIAGNOSIS — E78.00 PURE HYPERCHOLESTEROLEMIA: ICD-10-CM

## 2021-10-30 LAB
25(OH)D3 SERPL-MCNC: 14.2 NG/ML (ref 30–100)
ALBUMIN SERPL BCP-MCNC: 3.6 G/DL (ref 3.5–5)
ALP SERPL-CCNC: 72 U/L (ref 46–116)
ALT SERPL W P-5'-P-CCNC: 12 U/L (ref 12–78)
ANION GAP SERPL CALCULATED.3IONS-SCNC: 3 MMOL/L (ref 4–13)
AST SERPL W P-5'-P-CCNC: 8 U/L (ref 5–45)
BASOPHILS # BLD AUTO: 0.04 THOUSANDS/ΜL (ref 0–0.1)
BASOPHILS NFR BLD AUTO: 1 % (ref 0–1)
BILIRUB SERPL-MCNC: 0.35 MG/DL (ref 0.2–1)
BUN SERPL-MCNC: 11 MG/DL (ref 5–25)
CALCIUM SERPL-MCNC: 9 MG/DL (ref 8.3–10.1)
CHLORIDE SERPL-SCNC: 106 MMOL/L (ref 100–108)
CHOLEST SERPL-MCNC: 226 MG/DL (ref 50–200)
CO2 SERPL-SCNC: 29 MMOL/L (ref 21–32)
CREAT SERPL-MCNC: 0.54 MG/DL (ref 0.6–1.3)
EOSINOPHIL # BLD AUTO: 0.07 THOUSAND/ΜL (ref 0–0.61)
EOSINOPHIL NFR BLD AUTO: 1 % (ref 0–6)
ERYTHROCYTE [DISTWIDTH] IN BLOOD BY AUTOMATED COUNT: 11.9 % (ref 11.6–15.1)
EST. AVERAGE GLUCOSE BLD GHB EST-MCNC: 114 MG/DL
FERRITIN SERPL-MCNC: 20 NG/ML (ref 8–388)
GFR SERPL CREATININE-BSD FRML MDRD: 136 ML/MIN/1.73SQ M
GLUCOSE P FAST SERPL-MCNC: 99 MG/DL (ref 65–99)
HBA1C MFR BLD: 5.6 %
HCT VFR BLD AUTO: 38.9 % (ref 34.8–46.1)
HDLC SERPL-MCNC: 74 MG/DL
HGB BLD-MCNC: 12.8 G/DL (ref 11.5–15.4)
IMM GRANULOCYTES # BLD AUTO: 0.02 THOUSAND/UL (ref 0–0.2)
IMM GRANULOCYTES NFR BLD AUTO: 0 % (ref 0–2)
IRON SATN MFR SERPL: 16 % (ref 15–50)
IRON SERPL-MCNC: 76 UG/DL (ref 50–170)
LDLC SERPL CALC-MCNC: 102 MG/DL (ref 0–100)
LYMPHOCYTES # BLD AUTO: 1.61 THOUSANDS/ΜL (ref 0.6–4.47)
LYMPHOCYTES NFR BLD AUTO: 29 % (ref 14–44)
MCH RBC QN AUTO: 28.5 PG (ref 26.8–34.3)
MCHC RBC AUTO-ENTMCNC: 32.9 G/DL (ref 31.4–37.4)
MCV RBC AUTO: 87 FL (ref 82–98)
MONOCYTES # BLD AUTO: 0.42 THOUSAND/ΜL (ref 0.17–1.22)
MONOCYTES NFR BLD AUTO: 8 % (ref 4–12)
NEUTROPHILS # BLD AUTO: 3.38 THOUSANDS/ΜL (ref 1.85–7.62)
NEUTS SEG NFR BLD AUTO: 61 % (ref 43–75)
NRBC BLD AUTO-RTO: 0 /100 WBCS
PLATELET # BLD AUTO: 286 THOUSANDS/UL (ref 149–390)
PMV BLD AUTO: 10.6 FL (ref 8.9–12.7)
POTASSIUM SERPL-SCNC: 3.8 MMOL/L (ref 3.5–5.3)
PROT SERPL-MCNC: 7.4 G/DL (ref 6.4–8.2)
RBC # BLD AUTO: 4.49 MILLION/UL (ref 3.81–5.12)
SODIUM SERPL-SCNC: 138 MMOL/L (ref 136–145)
TIBC SERPL-MCNC: 469 UG/DL (ref 250–450)
TRIGL SERPL-MCNC: 250 MG/DL
TSH SERPL DL<=0.05 MIU/L-ACNC: 1.15 UIU/ML (ref 0.46–3.98)
WBC # BLD AUTO: 5.54 THOUSAND/UL (ref 4.31–10.16)

## 2021-10-30 PROCEDURE — 85025 COMPLETE CBC W/AUTO DIFF WBC: CPT

## 2021-10-30 PROCEDURE — 82728 ASSAY OF FERRITIN: CPT

## 2021-10-30 PROCEDURE — 83036 HEMOGLOBIN GLYCOSYLATED A1C: CPT

## 2021-10-30 PROCEDURE — 83550 IRON BINDING TEST: CPT

## 2021-10-30 PROCEDURE — 36415 COLL VENOUS BLD VENIPUNCTURE: CPT

## 2021-10-30 PROCEDURE — 80053 COMPREHEN METABOLIC PANEL: CPT

## 2021-10-30 PROCEDURE — 83540 ASSAY OF IRON: CPT

## 2021-10-30 PROCEDURE — 82306 VITAMIN D 25 HYDROXY: CPT

## 2021-10-30 PROCEDURE — 80061 LIPID PANEL: CPT

## 2021-10-30 PROCEDURE — 84443 ASSAY THYROID STIM HORMONE: CPT

## 2021-11-01 DIAGNOSIS — E55.9 VITAMIN D DEFICIENCY: Primary | ICD-10-CM

## 2021-11-01 RX ORDER — ERGOCALCIFEROL 1.25 MG/1
50000 CAPSULE ORAL WEEKLY
Qty: 12 CAPSULE | Refills: 0 | Status: SHIPPED | OUTPATIENT
Start: 2021-11-01

## (undated) DEVICE — MEDI-VAC YANKAUER SUCTION HANDLE W/BULBOUS AND CONTROL VENT: Brand: CARDINAL HEALTH

## (undated) DEVICE — NEEDLE 25G X 1 1/2

## (undated) DEVICE — PLUMEPEN PRO 10FT

## (undated) DEVICE — SCD SEQUENTIAL COMPRESSION COMFORT SLEEVE MEDIUM KNEE LENGTH: Brand: KENDALL SCD

## (undated) DEVICE — SUPER SPONGES,MEDIUM: Brand: KERLIX

## (undated) DEVICE — GLOVE INDICATOR PI UNDERGLOVE SZ 7.5 BLUE

## (undated) DEVICE — GLOVE SRG BIOGEL ECLIPSE 7

## (undated) DEVICE — DRAPE EQUIPMENT RF WAND

## (undated) DEVICE — GLOVE SRG BIOGEL 6

## (undated) DEVICE — TUBING SUCTION 5MM X 12 FT

## (undated) DEVICE — BETHLEHEM UNIVERSAL MINOR GEN: Brand: CARDINAL HEALTH

## (undated) DEVICE — INTENDED FOR TISSUE SEPARATION, AND OTHER PROCEDURES THAT REQUIRE A SHARP SURGICAL BLADE TO PUNCTURE OR CUT.: Brand: BARD-PARKER SAFETY BLADES SIZE 15, STERILE

## (undated) DEVICE — SUT SILK 2-0 SH 30 IN K833H

## (undated) DEVICE — SUT MONOCRYL 3-0 SH 27 IN Y416H

## (undated) DEVICE — CHLORAPREP HI-LITE 26ML ORANGE

## (undated) DEVICE — 2000CC GUARDIAN II: Brand: GUARDIAN

## (undated) DEVICE — SUT MONOCRYL 4-0 PS-2 27 IN Y426H

## (undated) DEVICE — ASTOUND STANDARD SURGICAL GOWN, XXL: Brand: CONVERTORS

## (undated) DEVICE — ADHESIVE SKN CLSR HISTOACRYL FLEX 0.5ML LF

## (undated) DEVICE — GLOVE SRG BIOGEL 6.5